# Patient Record
Sex: MALE | Race: WHITE | HISPANIC OR LATINO | Employment: FULL TIME | URBAN - METROPOLITAN AREA
[De-identification: names, ages, dates, MRNs, and addresses within clinical notes are randomized per-mention and may not be internally consistent; named-entity substitution may affect disease eponyms.]

---

## 2024-04-25 ENCOUNTER — OFFICE VISIT (OUTPATIENT)
Dept: FAMILY MEDICINE CLINIC | Facility: CLINIC | Age: 24
End: 2024-04-25
Payer: COMMERCIAL

## 2024-04-25 VITALS
OXYGEN SATURATION: 96 % | BODY MASS INDEX: 37.12 KG/M2 | TEMPERATURE: 97.5 F | DIASTOLIC BLOOD PRESSURE: 78 MMHG | HEIGHT: 66 IN | WEIGHT: 231 LBS | HEART RATE: 81 BPM | RESPIRATION RATE: 14 BRPM | SYSTOLIC BLOOD PRESSURE: 142 MMHG

## 2024-04-25 DIAGNOSIS — Z00.00 ROUTINE GENERAL MEDICAL EXAMINATION AT A HEALTH CARE FACILITY: Primary | ICD-10-CM

## 2024-04-25 DIAGNOSIS — N50.89 SCROTAL MASS: ICD-10-CM

## 2024-04-25 NOTE — PROGRESS NOTES
Chief Complaint  Annual Exam and Establish Care    Subjective        POPPY Han presents to [facility]  History of Present Illness    History of Present Illness  The patient is a 22-year-old male who presents to the office to establish care for physical.    The patient has [facility] in the [facility]. He served in [employer] from 2017 to 2023, transitioned to  in 2023.  In 2019, while deploying to [place], he sustained an ACL and meniscus injury while running on gravel, necessitating [facility] the [facility]. He experiences intermittent knee pain, which does not limit his activities. He is currently in the process of  and has no children. He has a puppy and a son. His overall health is generally good, with a particular focus on overeating, which he attempts to manage. 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 His health goal is to reach the current year, with a goal weight of 170 pounds, a goal he believes is a maintainable goal. He struggles with eating, often eating until his stomach feels full, and eating rapidly. He maintains a mental note of his eating habits and consumes smaller portions. He denies experiencing chest pain, heart palpitations, gastrointestinal issues, acid reflux, urinary issues, or unexpected muscle aches. He experiences hearing loss and tinnitus due to loud noises, and recurrent ear infections, which occur 2 to 3 times a month. He is unaware of a ruptured eardrum and attempts to maintain cleanliness. He experiences seasonal allergies. He snores and suspects sleep apnea, as evidenced by a sleep study performed a few weeks ago. He is awaiting the [facility] results. He experiences intermittent headaches and dizziness, which are being investigated by the [facility]. He occasionally suffers from migraines, rated as 10 out of 10,  which occasionally impedes his ability to get out of bed. He denies photophobia, nausea, or visual disturbances associated with these headaches. He finds relief from headaches by wearing sunglasses while driving or enrolled in a darker or quieter room. He does not take any medication for his headaches, but caffeine occasionally alleviates them.    The patient reports sleep issues, anxiety, depression, and PTSD, which are being addressed by the [facility]. He is not currently on any mental health or sleep medications, as he believes he does not need them. He manages his depression and anxiety effectively. If his sleep quality is poor, over-the-counter melatonin provides relief. His PTSD is exacerbated by loud noises and flashing lights, which do not impact his relationships. He believes his PTSD is well-managed and not causing him any problems. 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 He occasionally experiences difficulty breathing, but denies any wheezing or asthma-related symptoms. He occasionally holds his breath for 5 minutes while not thinking about his breathing. He is being evaluated for bronchitis and asthma at the [facility], but has not been hospitalized for breathing issues. He experienced grief following the death of his mother in 2016, which he believes was due to drug and alcohol abuse. He attended counseling, but discontinued it due to lack of improvement. He has good family support.    The patient discovered a lump in his genital area approximately 1.5 weeks ago, which has been causing him discomfort. He has an appointment with the [facility] for further evaluation. He occasionally experiences an uncomfortable sensation in his testes, which requires him to readjust his clothing when sitting in certain ways.    Supplemental Information  He takes diclofenac as needed for joint pain.   He does not smoke or vape. He drinks 1 to 2 beers occasionally. He denies any drug  "abuse.   He denies any family history of breast, ovarian, or colon cancer. His father had anxiety about 5 years ago, which is under control now. His mother passed away due to drugs and alcohol in 2016. He has 8 siblings. His maternal grandmother has diabetes, but he does not know what type.       Objective   Vital Signs:  /78   Pulse 81   Temp 97.5 °F (36.4 °C) (Infrared)   Resp 14   Ht 167.6 cm (66\")   Wt 105 kg (231 lb)   SpO2 96%   BMI 37.28 kg/m²   Estimated body mass index is 37.28 kg/m² as calculated from the following:    Height as of this encounter: 167.6 cm (66\").    Weight as of this encounter: 105 kg (231 lb).       Class 2 Severe Obesity (BMI >=35 and <=39.9). Obesity-related health conditions include the following: none. Obesity is newly identified. BMI is is above average; BMI management plan is completed. We discussed portion control, increasing exercise, and Information on healthy weight added to patient's after visit summary.      Physical Exam  Vitals and nursing note reviewed.   Constitutional:       General: He is not in acute distress.     Appearance: He is well-developed. He is obese. He is not ill-appearing.   HENT:      Head: Normocephalic and atraumatic.      Right Ear: Ear canal and external ear normal.      Left Ear: Ear canal and external ear normal.      Ears:      Comments: B/l TM dull     Mouth/Throat:      Mouth: Mucous membranes are moist.      Pharynx: Uvula midline. Posterior oropharyngeal erythema present. No oropharyngeal exudate.   Eyes:      General: No scleral icterus.        Right eye: No discharge.         Left eye: No discharge.      Conjunctiva/sclera: Conjunctivae normal.   Neck:      Thyroid: No thyromegaly.   Cardiovascular:      Rate and Rhythm: Normal rate and regular rhythm.      Heart sounds: Normal heart sounds. No murmur heard.  Pulmonary:      Effort: Pulmonary effort is normal.      Breath sounds: Normal breath sounds.   Abdominal:      General: " Bowel sounds are normal.      Palpations: Abdomen is soft.      Tenderness: There is no abdominal tenderness.   Genitourinary:     Comments: Upper right scrotal mass-above right teste and not attached to teste-rubbery, smooth, no erythema, mildly TTP  Musculoskeletal:         General: No deformity.      Cervical back: Neck supple.      Comments: Gait smooth and steady   Lymphadenopathy:      Cervical: No cervical adenopathy.   Skin:     General: Skin is warm and dry.   Neurological:      General: No focal deficit present.      Mental Status: He is alert and oriented to person, place, and time.   Psychiatric:         Mood and Affect: Mood normal.         Behavior: Behavior normal.         Thought Content: Thought content normal.          Physical Exam       Result Review :            Results                  Assessment and Plan     Diagnoses and all orders for this visit:    1. Routine general medical examination at a health care facility (Primary)  -     Hepatitis C Antibody  -     CBC & Differential  -     Comprehensive Metabolic Panel  -     TSH Rfx On Abnormal To Free T4  -     Chlamydia trachomatis, Neisseria gonorrhoeae, Trichomonas vaginalis, PCR - Urine, Urine, Clean Catch  -     RPR  -     HIV-1 / O / 2 Ag / Antibody  -     US Testicular or Ovarian Vascular Limited; Future  -     US Scrotum & Testicles; Future    2. Scrotal mass        Assessment & Plan  1.Routine physical  The patient's blood pressure was observed to be slightly elevated during this visit. A comprehensive panel of laboratory tests, including Complete Blood Count (CBC) and Comprehensive Metabolic Panel (CMP), has been ordered.  Appropriate health maintenance and prevention topics specific for this patient were discussed today.  Additionally, health goals, and health concerns addressed as appropriate.  Pt was encouraged to stay up to date on recommended screenings and vaccines based on USPSTF guidelines.       2. Mass right scrotum  An  ultrasound of the scrotum has been ordered which should be done within 1 week or less. STI testing ordered.  Pt aware of s/s that require emergent eval            Follow Up     No follow-ups on file.  Patient was given instructions and counseling regarding his condition or for health maintenance advice. Please see specific information pulled into the AVS if appropriate.    Patient or patient representative verbalized consent for the use of Ambient Listening during the visit with  JOSE Jensen for chart documentation. 5/3/2024  06:41 EDT

## 2024-04-28 LAB
ALBUMIN SERPL-MCNC: 4.6 G/DL (ref 4.3–5.2)
ALBUMIN/GLOB SERPL: 1.9 {RATIO} (ref 1.2–2.2)
ALP SERPL-CCNC: 75 IU/L (ref 44–121)
ALT SERPL-CCNC: 28 IU/L (ref 0–44)
AST SERPL-CCNC: 19 IU/L (ref 0–40)
BASOPHILS # BLD AUTO: 0.1 X10E3/UL (ref 0–0.2)
BASOPHILS NFR BLD AUTO: 1 %
BILIRUB SERPL-MCNC: 0.4 MG/DL (ref 0–1.2)
BUN SERPL-MCNC: 16 MG/DL (ref 6–20)
BUN/CREAT SERPL: 17 (ref 9–20)
C TRACH RRNA SPEC QL NAA+PROBE: NEGATIVE
CALCIUM SERPL-MCNC: 9.7 MG/DL (ref 8.7–10.2)
CHLORIDE SERPL-SCNC: 103 MMOL/L (ref 96–106)
CO2 SERPL-SCNC: 20 MMOL/L (ref 20–29)
CREAT SERPL-MCNC: 0.95 MG/DL (ref 0.76–1.27)
EGFRCR SERPLBLD CKD-EPI 2021: 114 ML/MIN/1.73
EOSINOPHIL # BLD AUTO: 0.2 X10E3/UL (ref 0–0.4)
EOSINOPHIL NFR BLD AUTO: 2 %
ERYTHROCYTE [DISTWIDTH] IN BLOOD BY AUTOMATED COUNT: 12.8 % (ref 11.6–15.4)
GLOBULIN SER CALC-MCNC: 2.4 G/DL (ref 1.5–4.5)
GLUCOSE SERPL-MCNC: 86 MG/DL (ref 70–99)
HCT VFR BLD AUTO: 49.7 % (ref 37.5–51)
HCV IGG SERPL QL IA: NON REACTIVE
HGB BLD-MCNC: 17.1 G/DL (ref 13–17.7)
HIV 1+2 AB+HIV1 P24 AG SERPL QL IA: NON REACTIVE
IMM GRANULOCYTES # BLD AUTO: 0 X10E3/UL (ref 0–0.1)
IMM GRANULOCYTES NFR BLD AUTO: 0 %
LYMPHOCYTES # BLD AUTO: 2.3 X10E3/UL (ref 0.7–3.1)
LYMPHOCYTES NFR BLD AUTO: 25 %
MCH RBC QN AUTO: 30.3 PG (ref 26.6–33)
MCHC RBC AUTO-ENTMCNC: 34.4 G/DL (ref 31.5–35.7)
MCV RBC AUTO: 88 FL (ref 79–97)
MONOCYTES # BLD AUTO: 0.8 X10E3/UL (ref 0.1–0.9)
MONOCYTES NFR BLD AUTO: 8 %
N GONORRHOEA RRNA SPEC QL NAA+PROBE: NEGATIVE
NEUTROPHILS # BLD AUTO: 6 X10E3/UL (ref 1.4–7)
NEUTROPHILS NFR BLD AUTO: 64 %
PLATELET # BLD AUTO: 325 X10E3/UL (ref 150–450)
POTASSIUM SERPL-SCNC: 4.6 MMOL/L (ref 3.5–5.2)
PROT SERPL-MCNC: 7 G/DL (ref 6–8.5)
RBC # BLD AUTO: 5.64 X10E6/UL (ref 4.14–5.8)
RPR SER QL: NON REACTIVE
SODIUM SERPL-SCNC: 139 MMOL/L (ref 134–144)
T VAGINALIS RRNA SPEC QL NAA+PROBE: NEGATIVE
TSH SERPL DL<=0.005 MIU/L-ACNC: 1.95 UIU/ML (ref 0.45–4.5)
WBC # BLD AUTO: 9.4 X10E3/UL (ref 3.4–10.8)

## 2024-04-29 ENCOUNTER — PATIENT ROUNDING (BHMG ONLY) (OUTPATIENT)
Dept: FAMILY MEDICINE CLINIC | Facility: CLINIC | Age: 24
End: 2024-04-29
Payer: COMMERCIAL

## 2024-04-29 NOTE — PROGRESS NOTES
A My-Chart message has been sent to the patient for PATIENT ROUNDING with [facility]

## 2024-05-01 ENCOUNTER — HOSPITAL ENCOUNTER (OUTPATIENT)
Dept: ULTRASOUND IMAGING | Facility: HOSPITAL | Age: 24
Discharge: HOME | End: 2024-05-01
Admitting: NURSE PRACTITIONER
Payer: COMMERCIAL

## 2024-05-01 DIAGNOSIS — Z00.00 ROUTINE GENERAL MEDICAL EXAMINATION AT A HEALTH CARE FACILITY: ICD-10-CM

## 2024-05-01 PROCEDURE — 76870 US EXAM SCROTUM: CPT

## 2024-05-01 PROCEDURE — 93976 VASCULAR STUDY: CPT

## 2024-05-03 ENCOUNTER — TELEPHONE (OUTPATIENT)
Dept: FAMILY MEDICINE CLINIC | Facility: CLINIC | Age: 24
End: 2024-05-03

## 2024-05-03 DIAGNOSIS — Z00.00 ROUTINE GENERAL MEDICAL EXAMINATION AT A HEALTH CARE FACILITY: Primary | ICD-10-CM

## 2024-05-03 DIAGNOSIS — N50.89 MASS, SCROTUM: ICD-10-CM

## 2024-05-03 NOTE — TELEPHONE ENCOUNTER
Hub staff attempted to follow warm transfer process and was unsuccessful     Caller: POPPY Han    Relationship to patient: Self    Best call back number: [phone]     Patient is needing: CALLING AURELIANO BACK FOR RESULTS

## 2024-05-06 NOTE — TELEPHONE ENCOUNTER
Spoke with patient about his imaging results. Voiced understanding. Could not get to his result note.

## 2024-05-06 NOTE — TELEPHONE ENCOUNTER
Called patient back. He was not able to speak with this nurse at that time. Will call office back in an hour.